# Patient Record
Sex: MALE | Race: WHITE | Employment: FULL TIME | URBAN - METROPOLITAN AREA
[De-identification: names, ages, dates, MRNs, and addresses within clinical notes are randomized per-mention and may not be internally consistent; named-entity substitution may affect disease eponyms.]

---

## 2021-05-25 ENCOUNTER — HOSPITAL ENCOUNTER (EMERGENCY)
Facility: CLINIC | Age: 36
Discharge: HOME | End: 2021-05-25
Attending: EMERGENCY MEDICINE

## 2021-05-25 VITALS
TEMPERATURE: 98.2 F | OXYGEN SATURATION: 98 % | DIASTOLIC BLOOD PRESSURE: 104 MMHG | HEART RATE: 90 BPM | RESPIRATION RATE: 18 BRPM | SYSTOLIC BLOOD PRESSURE: 148 MMHG | WEIGHT: 282 LBS

## 2021-05-25 DIAGNOSIS — S61.213A LACERATION OF LEFT MIDDLE FINGER WITHOUT FOREIGN BODY WITHOUT DAMAGE TO NAIL, INITIAL ENCOUNTER: Primary | ICD-10-CM

## 2021-05-25 DIAGNOSIS — B35.9 DERMATOPHYTOSIS: ICD-10-CM

## 2021-05-25 PROCEDURE — 12001 RPR S/N/AX/GEN/TRNK 2.5CM/<: CPT

## 2021-05-25 PROCEDURE — 99282 EMERGENCY DEPT VISIT SF MDM: CPT

## 2021-05-25 NOTE — ED PROVIDER NOTES
[facility] ED  [address] [facility]  Phone: [id number] ED  EMERGENCY DEPARTMENT ENCOUNTER      Pt Name: Jovanni Torres  MRN: [id number]  [place] 1985  Date of evaluation: 5/25/2021  Provider: Paula Benites DO    CHIEF COMPLAINT       Chief Complaint   Patient presents with    Laceration     laceration to left middle finger from a metal tape measure          HISTORY OF PRESENT ILLNESS   (Location/Symptom, Timing/Onset,Context/Setting, Quality, Duration, Modifying Factors, Severity)  Note limiting factors. Jovanni Torres is a 37 y.o. male who presents to the emergency department for the evaluation of laceration to the left middle finger as well as some skin fungus on his right hand fingers. Patient injured the hand at work today on a tape measure, no numbness or weakness. Mild pain that is mostly gone. In terms of the fungal type infection this is been going on for at least a month, he has been using Eucerin without any relief. Nursing Notes were reviewed. REVIEW OF SYSTEMS    (2-9systems for level 4, 10 or more for level 5)     Review of Systems   Skin: Positive for wound. Fungus on right hand   Neurological: Negative for weakness and numbness. Except asnoted above the remainder of the review of systems was reviewed and negative. PAST MEDICAL HISTORY   History reviewed. No pertinent past medical history. SURGICAL HISTORY     History reviewed. No pertinent surgical history. CURRENT MEDICATIONS     Discharge Medication List as of 5/25/2021  7:01 PM          ALLERGIES     Patient has no known allergies. FAMILY HISTORY     History reviewed. No pertinent family history.        SOCIAL HISTORY       Social History     Socioeconomic History    Marital status:      Spouse name: None    Number of children: None    Years of education: None    Highest education level: None   Occupational History    None   Tobacco Use    Smoking status: Never Smoker   Substance and Sexual Activity    Alcohol use: Never    Drug use: Yes     Types: Marijuana    Sexual activity: None   Other Topics Concern    None   Social History Narrative    None     Social Determinants of Health     Financial Resource Strain:     Difficulty of Paying Living Expenses:    Food Insecurity:     Worried About Running Out of Food in the Last Year:     [address] in the Last Year:    Transportation Needs:     Lack of Transportation (Medical):  Lack of Transportation (Non-Medical):    Physical Activity:     Days of Exercise per Week:     Minutes of Exercise per Session:    Stress:     Feeling of Stress :    Social Connections:     Frequency of Communication with Friends and Family:     Frequency of Social Gatherings with Friends and Family:     Attends [employer] Services:     Active Member of Clubs or Organizations:     Attends Club or Organization Meetings:     Marital Status:    Intimate Partner Violence:     Fear of Current or Ex-Partner:     Emotionally Abused:     Physically Abused:     Sexually Abused:        SCREENINGS             PHYSICAL EXAM    (up to 7 for level 4, 8 or more for level 5)     ED Triage Vitals [05/25/21 1832]   BP Temp Temp Source Pulse Resp SpO2 Height Weight   (!) 148/104 98.2 °F (36.8 °C) Oral 90 18 98 % -- 282 lb (127.9 kg)       Physical Exam  Vitals and nursing note reviewed. Constitutional:       General: He is not in acute distress. Appearance: Normal appearance. He is not ill-appearing or toxic-appearing. HENT:      Head: Normocephalic and atraumatic. Nose: Nose normal. No congestion. Mouth/Throat:      Mouth: Mucous membranes are moist.   Eyes:      General:         Right eye: No discharge. Left eye: No discharge. Conjunctiva/sclera: Conjunctivae normal.   Cardiovascular:      Rate and Rhythm: Normal rate and regular rhythm. Pulses: Normal pulses.       Heart sounds: Normal heart sounds. No murmur heard. Pulmonary:      Effort: Pulmonary effort is normal. No respiratory distress. Breath sounds: Normal breath sounds. No wheezing. Abdominal:      General: There is no distension. Musculoskeletal:         General: No deformity or signs of injury. Normal range of motion. Cervical back: Normal range of motion. Skin:     General: Skin is warm and dry. Capillary Refill: Capillary refill takes less than 2 seconds. Findings: No rash. Comments: There is evidence of a fungal infection at the distal fingers on numbers 3 and 4 on the right hand. There is also a small, 1 cm laceration on the middle finger, pad surface of the left hand   Neurological:      General: No focal deficit present. Mental Status: He is alert and oriented to person, place, and time. Mental status is at baseline. Sensory: No sensory deficit. Motor: No weakness. Comments: Speaking normally. No facial asymmetry. Moving all 4 extremities. Normal gait. Psychiatric:         Mood and Affect: Mood normal.         EMERGENCY DEPARTMENT COURSE and DIFFERENTIAL DIAGNOSIS/MDM:   Vitals:    Vitals:    05/25/21 1832   BP: (!) 148/104   Pulse: 90   Resp: 18   Temp: 98.2 °F (36.8 °C)   TempSrc: Oral   SpO2: 98%   Weight: 127.9 kg (282 lb)       Patient presents to the emergency department with a complaint described above. Vital signs are normal.  He is nontoxic in appearance and he is neurovascularly intact. He will need suture repair on the left hand and I will write for a nystatin/steroid cream for the right hand and I have talked about PCP follow-up    At this time the patient is without objective evidence of an acute process requiring hospitalization or inpatient management. They have remained hemodynamically stable and are stable for discharge with outpatient follow-up. Standard anticipatory guidance given to patient upon discharge.   Have given them a specific time frame in which to follow-up and who to follow-up with. I have also advised them that they should return to the emergency department if they get worse, or not getting better or develop any new or concerning symptoms. Patient demonstrates understanding. PROCEDURES:  Unless otherwise noted below, none     Lac Repair    Date/Time: 5/25/2021 7:11 PM  Performed by: Yoan Hernandez DO  Authorized by: Yoan Hernandez DO     Consent:     Consent obtained:  Verbal    Consent given by:  Patient    Risks discussed:  Poor cosmetic result    Alternatives discussed:  No treatment  Anesthesia (see MAR for exact dosages): Anesthesia method:  Local infiltration    Local anesthetic:  Lidocaine 1% w/o epi  Laceration details:     Location:  Finger    Finger location:  L long finger    Wound length (cm): 1. Repair type:     Repair type:  Simple  Pre-procedure details:     Preparation:  Patient was prepped and draped in usual sterile fashion  Exploration:     Wound extent: no fascia violation noted, no foreign bodies/material noted, no tendon damage noted and no underlying fracture noted      Contaminated: no    Treatment:     Area cleansed with:  Hibiclens    Amount of cleaning:  Standard  Skin repair:     Repair method:  Sutures    Suture size:  4-0    Suture material:  Nylon    Suture technique:  Horizontal mattress    Number of sutures:  1  Approximation:     Approximation:  Close  Post-procedure details:     Dressing:  Open (no dressing)    Patient tolerance of procedure: Tolerated well, no immediate complications        FINAL IMPRESSION      1. Laceration of left middle finger without foreign body without damage to nail, initial encounter    2.  Dermatophytosis          DISPOSITION/PLAN   DISPOSITION Decision To Discharge 05/25/2021 06:55:56 PM      PATIENT REFERRED TO:  Carlene Gonzales primary doctor  If you do not have a primary care physician or you are looking for a new physician, please contact the following number [phone] to establish one. In 1 week  For suture removal, For wound re-check      DISCHARGE MEDICATIONS:  Discharge Medication List as of 5/25/2021  7:01 PM      START taking these medications    Details   nystatin-triamcinolone (MYCOLOG II) 848014-7.1 UNIT/GM-% cream Apply topically 3 times daily. , Disp-60 g, R-0, Normal                (Please note that portions of this note were completed with a voice recognition program.  Efforts were made to edit the dictations but occasionally words are mis-transcribed.)    Jv Callejas DO (electronically signed)  Board Certified Emergency Physician         Jv Callejas DO  05/25/21 1912